# Patient Record
Sex: MALE | Race: WHITE | NOT HISPANIC OR LATINO | Employment: OTHER | URBAN - NONMETROPOLITAN AREA
[De-identification: names, ages, dates, MRNs, and addresses within clinical notes are randomized per-mention and may not be internally consistent; named-entity substitution may affect disease eponyms.]

---

## 2017-10-23 ENCOUNTER — OFFICE VISIT (OUTPATIENT)
Dept: RETAIL CLINIC | Facility: CLINIC | Age: 42
End: 2017-10-23

## 2017-10-23 VITALS
OXYGEN SATURATION: 98 % | TEMPERATURE: 97.6 F | HEART RATE: 87 BPM | DIASTOLIC BLOOD PRESSURE: 87 MMHG | SYSTOLIC BLOOD PRESSURE: 131 MMHG

## 2017-10-23 DIAGNOSIS — J20.9 ACUTE BRONCHITIS, UNSPECIFIED ORGANISM: Primary | ICD-10-CM

## 2017-10-23 PROCEDURE — 99202 OFFICE O/P NEW SF 15 MIN: CPT | Performed by: NURSE PRACTITIONER

## 2017-10-23 RX ORDER — METHYLPREDNISOLONE 4 MG/1
TABLET ORAL
Qty: 1 EACH | Refills: 0 | Status: SHIPPED | OUTPATIENT
Start: 2017-10-23 | End: 2018-03-07

## 2017-10-23 RX ORDER — AZITHROMYCIN 250 MG/1
TABLET, FILM COATED ORAL
Qty: 6 TABLET | Refills: 0 | Status: SHIPPED | OUTPATIENT
Start: 2017-10-23 | End: 2018-03-07

## 2017-10-23 NOTE — PATIENT INSTRUCTIONS
Increase fluid intake  Warm salt water gargles as needed for sore throat  Do not over suppress cough  Follow up with Dr. Malave regarding lump to neck  If no improvement over next 2-3 days or symptoms worsen, follow up with PCP      Acute Bronchitis  Bronchitis is inflammation of the airways that extend from the windpipe into the lungs (bronchi). The inflammation often causes mucus to develop. This leads to a cough, which is the most common symptom of bronchitis.   In acute bronchitis, the condition usually develops suddenly and goes away over time, usually in a couple weeks. Smoking, allergies, and asthma can make bronchitis worse. Repeated episodes of bronchitis may cause further lung problems.   CAUSES  Acute bronchitis is most often caused by the same virus that causes a cold. The virus can spread from person to person (contagious) through coughing, sneezing, and touching contaminated objects.  SIGNS AND SYMPTOMS   · Cough.    · Fever.    · Coughing up mucus.    · Body aches.    · Chest congestion.    · Chills.    · Shortness of breath.    · Sore throat.    DIAGNOSIS   Acute bronchitis is usually diagnosed through a physical exam. Your health care provider will also ask you questions about your medical history. Tests, such as chest X-rays, are sometimes done to rule out other conditions.   TREATMENT   Acute bronchitis usually goes away in a couple weeks. Oftentimes, no medical treatment is necessary. Medicines are sometimes given for relief of fever or cough. Antibiotic medicines are usually not needed but may be prescribed in certain situations. In some cases, an inhaler may be recommended to help reduce shortness of breath and control the cough. A cool mist vaporizer may also be used to help thin bronchial secretions and make it easier to clear the chest.   HOME CARE INSTRUCTIONS  · Get plenty of rest.    · Drink enough fluids to keep your urine clear or pale yellow (unless you have a medical condition that  requires fluid restriction). Increasing fluids may help thin your respiratory secretions (sputum) and reduce chest congestion, and it will prevent dehydration.    · Take medicines only as directed by your health care provider.  · If you were prescribed an antibiotic medicine, finish it all even if you start to feel better.  · Avoid smoking and secondhand smoke. Exposure to cigarette smoke or irritating chemicals will make bronchitis worse. If you are a smoker, consider using nicotine gum or skin patches to help control withdrawal symptoms. Quitting smoking will help your lungs heal faster.    · Reduce the chances of another bout of acute bronchitis by washing your hands frequently, avoiding people with cold symptoms, and trying not to touch your hands to your mouth, nose, or eyes.    · Keep all follow-up visits as directed by your health care provider.    SEEK MEDICAL CARE IF:  Your symptoms do not improve after 1 week of treatment.   SEEK IMMEDIATE MEDICAL CARE IF:  · You develop an increased fever or chills.    · You have chest pain.    · You have severe shortness of breath.  · You have bloody sputum.    · You develop dehydration.  · You faint or repeatedly feel like you are going to pass out.  · You develop repeated vomiting.  · You develop a severe headache.  MAKE SURE YOU:   · Understand these instructions.  · Will watch your condition.  · Will get help right away if you are not doing well or get worse.     This information is not intended to replace advice given to you by your health care provider. Make sure you discuss any questions you have with your health care provider.     Document Released: 01/25/2006 Document Revised: 01/08/2016 Document Reviewed: 06/10/2014  [employer] Interactive Patient Education ©2017 [employer]

## 2017-10-23 NOTE — PROGRESS NOTES
"Subjective   Madi Vazquez is a 42 y.o. male.     Cough   This is a new problem. The current episode started 1 to 4 weeks ago (2 weeks). The problem has been waxing and waning. Cough characteristics: LIttle bit of yellow/clear. Associated symptoms include a fever (When it first started; has been over 1 week since last fever) and wheezing. Pertinent negatives include no ear pain (Was positive for left ear pain; has resolved) or shortness of breath. Associated symptoms comments: Initial symptoms start has head congestion; this has resolved but now congestion remains in chest.  Patient states today he thinks it is \"going away\" but wife wanted him \"checked out\". Treatments tried: Tussin; Oscillococcinum. The treatment provided mild relief. There is no history of asthma, bronchitis, COPD or pneumonia. Granuloma on lung    Patient reports that he walks 2 miles a day and still able to perform this activity without difficulty.     The following portions of the patient's history were reviewed and updated as appropriate: allergies, current medications, past family history, past medical history, past social history, past surgical history and problem list.    Review of Systems   Constitutional: Positive for fever (When it first started; has been over 1 week since last fever).   HENT: Positive for congestion. Negative for ear pain (Was positive for left ear pain; has resolved) and sinus pressure. Sinus pain: Has resolved.    Eyes: Positive for visual disturbance (Little blurriness just while sitting in waiting area. ).   Respiratory: Positive for cough, chest tightness and wheezing. Negative for shortness of breath.    Gastrointestinal: Positive for vomiting. Negative for diarrhea and nausea. Rectal pain: States he vomits when \"sinuses start draining\"   Musculoskeletal: Positive for back pain (Lower back).       Objective   Physical Exam   Constitutional: He appears well-developed and well-nourished. No distress.   HENT:   Right " Ear: External ear normal. Tympanic membrane is not erythematous (Pink) and not bulging.   Left Ear: External ear normal. Tympanic membrane is not erythematous (Pink) and not bulging.   Nose: Right sinus exhibits no maxillary sinus tenderness and no frontal sinus tenderness. Left sinus exhibits no maxillary sinus tenderness and no frontal sinus tenderness.   Mouth/Throat: Posterior oropharyngeal erythema present. No oropharyngeal exudate.   Neck: Neck supple.   Patient had full, long beard covering lower face and neck.  On palpation noted slightly enlarged tonsillar nodes and 1 harder, nontender, mobile lump to right side near tonsillar node.  Patient believes this has been there for awhile.    Cardiovascular: Normal rate, regular rhythm and normal heart sounds.  Exam reveals no gallop and no friction rub.    No murmur heard.  Pulmonary/Chest: Effort normal and breath sounds normal. No respiratory distress. He has no decreased breath sounds. He has no wheezes. He has no rhonchi. He has no rales. He exhibits no tenderness.   Coarse breath sounds throughout.    Lymphadenopathy:     He has no cervical adenopathy.   Neurological: He is alert.   Skin: Skin is warm and dry. He is not diaphoretic.   Psychiatric: He has a normal mood and affect. His behavior is normal.       Assessment/Plan   Madi was seen today for cough.    Diagnoses and all orders for this visit:    Acute bronchitis, unspecified organism  -     azithromycin (ZITHROMAX Z-NELLA) 250 MG tablet; Take 2 tablets the first day, then 1 tablet daily for 4 days.  -     MethylPREDNISolone (MEDROL, NELLA,) 4 MG tablet; Take as directed on package instructions.      Discussed with patient to monitor symptoms closely and follow up with PCP for possible chest x-ray if needed.  Informed that cough can linger at times with Bronchitis.  Discussed concerns with lump noted to right side of neck.  Patient believes this has been there for some time now but also states he has a  scar located in that area.  Encouraged follow up with PCP for re-evaluation.  Discussed concerns and need for further evaluation.  Verbalized understanding.     Increase fluid intake  Warm salt water gargles as needed for sore throat  Do not over suppress cough  Follow up with Dr. Malave regarding lump to neck  If no improvement over next 2-3 days or symptoms worsen, follow up with PCP

## 2018-03-07 ENCOUNTER — OFFICE VISIT (OUTPATIENT)
Dept: RETAIL CLINIC | Facility: CLINIC | Age: 43
End: 2018-03-07

## 2018-03-07 VITALS
TEMPERATURE: 99.7 F | DIASTOLIC BLOOD PRESSURE: 80 MMHG | HEART RATE: 90 BPM | OXYGEN SATURATION: 98 % | SYSTOLIC BLOOD PRESSURE: 115 MMHG

## 2018-03-07 DIAGNOSIS — R68.89 FLU-LIKE SYMPTOMS: ICD-10-CM

## 2018-03-07 DIAGNOSIS — J20.9 ACUTE BRONCHITIS, UNSPECIFIED ORGANISM: Primary | ICD-10-CM

## 2018-03-07 PROCEDURE — 99213 OFFICE O/P EST LOW 20 MIN: CPT | Performed by: NURSE PRACTITIONER

## 2018-03-07 RX ORDER — BENZONATATE 200 MG/1
200 CAPSULE ORAL 3 TIMES DAILY PRN
Qty: 15 CAPSULE | Refills: 0 | Status: SHIPPED | OUTPATIENT
Start: 2018-03-07 | End: 2019-10-31

## 2018-03-07 RX ORDER — BENZONATATE 200 MG/1
200 CAPSULE ORAL 3 TIMES DAILY PRN
Qty: 15 CAPSULE | Refills: 0 | Status: SHIPPED | OUTPATIENT
Start: 2018-03-07 | End: 2018-03-07 | Stop reason: SDUPTHER

## 2018-03-07 RX ORDER — AZITHROMYCIN 250 MG/1
TABLET, FILM COATED ORAL
Qty: 6 TABLET | Refills: 0 | Status: SHIPPED | OUTPATIENT
Start: 2018-03-07 | End: 2019-10-31

## 2018-03-07 RX ORDER — AZITHROMYCIN 250 MG/1
TABLET, FILM COATED ORAL
Qty: 6 TABLET | Refills: 0 | Status: SHIPPED | OUTPATIENT
Start: 2018-03-07 | End: 2018-03-07 | Stop reason: SDUPTHER

## 2018-03-07 RX ORDER — ONDANSETRON 4 MG/1
4 TABLET, ORALLY DISINTEGRATING ORAL EVERY 8 HOURS PRN
Qty: 10 TABLET | Refills: 0 | Status: SHIPPED | OUTPATIENT
Start: 2018-03-07

## 2018-03-07 NOTE — PATIENT INSTRUCTIONS
Monitor symptoms closely.    It is possible you have Flu/ or other upper resp virus.  You need to be careful not to over suppress your cough.  Make sure you take good deep breaths and cough to keep lungs clear.   If you develop high temp, you are not seeing any improvement over the next 24-48 hours, or symptom start to worsen, follow up with primary doctor or urgent care clinic for further evaluation.    Increase your fluids.  Rest.       Influenza, Adult  Influenza, more commonly known as “the flu,” is a viral infection that primarily affects the respiratory tract. The respiratory tract includes organs that help you breathe, such as the lungs, nose, and throat. The flu causes many common cold symptoms, as well as a high fever and body aches.  The flu spreads easily from person to person (is contagious). Getting a flu shot (influenza vaccination) every year is the best way to prevent influenza.  What are the causes?  Influenza is caused by a virus. You can catch the virus by:  · Breathing in droplets from an infected person's cough or sneeze.  · Touching something that was recently contaminated with the virus and then touching your mouth, nose, or eyes.  What increases the risk?  The following factors may make you more likely to get the flu:  · Not cleaning your hands frequently with soap and water or alcohol-based hand .  · Having close contact with many people during cold and flu season.  · Touching your mouth, eyes, or nose without washing or sanitizing your hands first.  · Not drinking enough fluids or not eating a healthy diet.  · Not getting enough sleep or exercise.  · Being under a high amount of stress.  · Not getting a yearly (annual) flu shot.  You may be at a higher risk of complications from the flu, such as a severe lung infection (pneumonia), if you:  · Are over the age of 65.  · Are pregnant.  · Have a weakened disease-fighting system (immune system). You may have a weakened immune system if  you:  ¨ Have HIV or AIDS.  ¨ Are undergoing chemotherapy.  ¨ Are taking medicines that reduce the activity of (suppress) the immune system.  · Have a long-term (chronic) illness, such as heart disease, kidney disease, diabetes, or lung disease.  · Have a liver disorder.  · Are obese.  · Have anemia.  What are the signs or symptoms?  Symptoms of this condition typically last 4-10 days and may include:  · Fever.  · Chills.  · Headache, body aches, or muscle aches.  · Sore throat.  · Cough.  · Runny or congested nose.  · Chest discomfort and cough.  · Poor appetite.  · Weakness or tiredness (fatigue).  · Dizziness.  · Nausea or vomiting.  How is this diagnosed?  This condition may be diagnosed based on your medical history and a physical exam. Your health care provider may do a nose or throat swab test to confirm the diagnosis.  How is this treated?  If influenza is detected early, you can be treated with antiviral medicine that can reduce the length of your illness and the severity of your symptoms. This medicine may be given by mouth (orally) or through an IV tube that is inserted in one of your veins.  The goal of treatment is to relieve symptoms by taking care of yourself at home. This may include taking over-the-counter medicines, drinking plenty of fluids, and adding humidity to the air in your home.  In some cases, influenza goes away on its own. Severe influenza or complications from influenza may be treated in a hospital.  Follow these instructions at home:  · Take over-the-counter and prescription medicines only as told by your health care provider.  · Use a cool mist humidifier to add humidity to the air in your home. This can make breathing easier.  · Rest as needed.  · Drink enough fluid to keep your urine clear or pale yellow.  · Cover your mouth and nose when you cough or sneeze.  · Wash your hands with soap and water often, especially after you cough or sneeze. If soap and water are not available, use  hand .  · Stay home from work or school as told by your health care provider. Unless you are visiting your health care provider, try to avoid leaving home until your fever has been gone for 24 hours without the use of medicine.  · Keep all follow-up visits as told by your health care provider. This is important.  How is this prevented?  · Getting an annual flu shot is the best way to avoid getting the flu. You may get the flu shot in late summer, fall, or winter. Ask your health care provider when you should get your flu shot.  · Wash your hands often or use hand  often.  · Avoid contact with people who are sick during cold and flu season.  · Eat a healthy diet, drink plenty of fluids, get enough sleep, and exercise regularly.  Contact a health care provider if:  · You develop new symptoms.  · You have:  ¨ Chest pain.  ¨ Diarrhea.  ¨ A fever.  · Your cough gets worse.  · You produce more mucus.  · You feel nauseous or you vomit.  Get help right away if:  · You develop shortness of breath or difficulty breathing.  · Your skin or nails turn a bluish color.  · You have severe pain or stiffness in your neck.  · You develop a sudden headache or sudden pain in your face or ear.  · You cannot stop vomiting.  This information is not intended to replace advice given to you by your health care provider. Make sure you discuss any questions you have with your health care provider.  Document Released: 12/15/2001 Document Revised: 05/25/2017 Document Reviewed: 10/11/2016  [employer] Interactive Patient Education © 2017 [employer]    Acute Bronchitis, Adult  Acute bronchitis is sudden (acute) swelling of the air tubes (bronchi) in the lungs. Acute bronchitis causes these tubes to fill with mucus, which can make it hard to breathe. It can also cause coughing or wheezing.  In adults, acute bronchitis usually goes away within 2 weeks. A cough caused by bronchitis may last up to 3 weeks. Smoking, allergies, and  asthma can make the condition worse. Repeated episodes of bronchitis may cause further lung problems, such as chronic obstructive pulmonary disease (COPD).  What are the causes?  This condition can be caused by germs and by substances that irritate the lungs, including:  · Cold and flu viruses. This condition is most often caused by the same virus that causes a cold.  · Bacteria.  · Exposure to tobacco smoke, dust, fumes, and air pollution.  What increases the risk?  This condition is more likely to develop in people who:  · Have close contact with someone with acute bronchitis.  · Are exposed to lung irritants, such as tobacco smoke, dust, fumes, and vapors.  · Have a weak immune system.  · Have a respiratory condition such as asthma.  What are the signs or symptoms?  Symptoms of this condition include:  · A cough.  · Coughing up clear, yellow, or green mucus.  · Wheezing.  · Chest congestion.  · Shortness of breath.  · A fever.  · Body aches.  · Chills.  · A sore throat.  How is this diagnosed?  This condition is usually diagnosed with a physical exam. During the exam, your health care provider may order tests, such as chest X-rays, to rule out other conditions. He or she may also:  · Test a sample of your mucus for bacterial infection.  · Check the level of oxygen in your blood. This is done to check for pneumonia.  · Do a chest X-ray or lung function testing to rule out pneumonia and other conditions.  · Perform blood tests.  Your health care provider will also ask about your symptoms and medical history.  How is this treated?  Most cases of acute bronchitis clear up over time without treatment. Your health care provider may recommend:  · Drinking more fluids. Drinking more makes your mucus thinner, which may make it easier to breathe.  · Taking a medicine for a fever or cough.  · Taking an antibiotic medicine.  · Using an inhaler to help improve shortness of breath and to control a cough.  · Using a cool mist  vaporizer or humidifier to make it easier to breathe.  Follow these instructions at home:  Medicines   · Take over-the-counter and prescription medicines only as told by your health care provider.  · If you were prescribed an antibiotic, take it as told by your health care provider. Do not stop taking the antibiotic even if you start to feel better.  General instructions   · Get plenty of rest.  · Drink enough fluids to keep your urine clear or pale yellow.  · Avoid smoking and secondhand smoke. Exposure to cigarette smoke or irritating chemicals will make bronchitis worse. If you smoke and you need help quitting, ask your health care provider. Quitting smoking will help your lungs heal faster.  · Use an inhaler, cool mist vaporizer, or humidifier as told by your health care provider.  · Keep all follow-up visits as told by your health care provider. This is important.  How is this prevented?  To lower your risk of getting this condition again:  · Wash your hands often with soap and water. If soap and water are not available, use hand .  · Avoid contact with people who have cold symptoms.  · Try not to touch your hands to your mouth, nose, or eyes.  · Make sure to get the flu shot every year.  Contact a health care provider if:  · Your symptoms do not improve in 2 weeks of treatment.  Get help right away if:  · You cough up blood.  · You have chest pain.  · You have severe shortness of breath.  · You become dehydrated.  · You faint or keep feeling like you are going to faint.  · You keep vomiting.  · You have a severe headache.  · Your fever or chills gets worse.  This information is not intended to replace advice given to you by your health care provider. Make sure you discuss any questions you have with your health care provider.  Document Released: 01/25/2006 Document Revised: 07/12/2017 Document Reviewed: 06/07/2017  [employer] Interactive Patient Education © 2017 [employer]

## 2018-03-07 NOTE — PROGRESS NOTES
Subjective   Madi Vazquez is a 43 y.o. male.     Cough   This is a new problem. The current episode started in the past 7 days (2 nights). The problem has been gradually worsening. Cough characteristics: Clear. Associated symptoms include myalgias (Back pain, leg pain (thinks it is related to vomiting)), rhinorrhea, a sore throat and sweats (Last night). Chest pain: Burning. Fever: Has not checked. Associated symptoms comments: Vomiting from coughing. The symptoms are aggravated by lying down (Son has had cough and fever but these symptoms have resolved for him). Risk factors for lung disease include smoking/tobacco exposure. Treatments tried: Sudafed. The treatment provided no relief. His past medical history is significant for bronchitis. There is no history of asthma, COPD or pneumonia.        The following portions of the patient's history were reviewed and updated as appropriate: allergies, current medications, past family history, past medical history, past social history, past surgical history and problem list.    Review of Systems   Constitutional: Positive for appetite change (Doesn't feel like eating). Fever: Has not checked.   HENT: Positive for congestion, rhinorrhea and sore throat.    Eyes: Negative.    Respiratory: Positive for cough.    Cardiovascular: Chest pain: Burning.   Gastrointestinal: Positive for nausea and vomiting. Negative for abdominal pain and diarrhea.   Genitourinary: Negative for dysuria, frequency and urgency.   Musculoskeletal: Positive for myalgias (Back pain, leg pain (thinks it is related to vomiting)).       Objective   Physical Exam   Constitutional: He appears well-developed and well-nourished. He appears ill (Mild; looks like he doesn't feel well). No distress.   HENT:   Right Ear: External ear normal. Tympanic membrane is not erythematous (Pink) and not bulging.   Left Ear: External ear normal. Tympanic membrane is not erythematous (Pink) and not bulging.   Nose: Right  sinus exhibits frontal sinus tenderness. Right sinus exhibits no maxillary sinus tenderness. Left sinus exhibits frontal sinus tenderness. Left sinus exhibits no maxillary sinus tenderness.   Mouth/Throat: Posterior oropharyngeal erythema (PND noted) present. No oropharyngeal exudate. Tonsils are 0 on the right. Tonsils are 0 on the left.   Neck: Neck supple.   Cardiovascular: Normal rate, regular rhythm and normal heart sounds.  Exam reveals no gallop and no friction rub.    No murmur heard.  Pulmonary/Chest: Effort normal and breath sounds normal. No respiratory distress. He has no decreased breath sounds. He has no wheezes. He has no rhonchi. He has no rales. He exhibits tenderness.   Coarse breath sounds throughout; more pronounced to SKYLER; Frequent dry cough during exam   Abdominal: Bowel sounds are normal.   Lymphadenopathy:     He has no cervical adenopathy.   Neurological: He is alert.   Skin: Skin is warm and dry. He is not diaphoretic.   Psychiatric: He has a normal mood and affect. His behavior is normal.       Assessment/Plan   Madi was seen today for cough.    Diagnoses and all orders for this visit:    Acute bronchitis, unspecified organism  -     azithromycin (ZITHROMAX Z-NELLA) 250 MG tablet; Take 2 tablets the first day, then 1 tablet daily for 4 days.  -     benzonatate (TESSALON) 200 MG capsule; Take 1 capsule by mouth 3 (Three) Times a Day As Needed for Cough.    Flu-like symptoms    Offered to send for Flu test today in other outpatient settling.  Not available in this clinic at this time.  Patient declined at this time.  Advised to contact clinic if he decided he wanted to get one.   Patient declined any urinary issues today.  States he thinks he passes a kidney stone yesterday.  As long history of this and is familiar with what stones feel like.  Denies that yesterday was any different than usual episodes.  Instructed to seek additional evaluation at higher level of care if any urinary  symptoms returned.     Monitor symptoms closely.    It is possible you have Flu/ or other upper resp virus.  You need to be careful not to over suppress your cough.  Make sure you take good deep breaths and cough to keep lungs clear.   If you develop high temp, you are not seeing any improvement over the next 24-48 hours, or symptom start to worsen, follow up with primary doctor or urgent care clinic for further evaluation.    Increase your fluids.  Rest.

## 2018-07-04 ENCOUNTER — APPOINTMENT (OUTPATIENT)
Dept: CT IMAGING | Age: 43
End: 2018-07-04
Payer: MEDICARE

## 2018-07-04 ENCOUNTER — APPOINTMENT (OUTPATIENT)
Dept: GENERAL RADIOLOGY | Age: 43
End: 2018-07-04
Payer: MEDICARE

## 2018-07-04 ENCOUNTER — HOSPITAL ENCOUNTER (EMERGENCY)
Age: 43
Discharge: HOME | End: 2018-07-04
Attending: EMERGENCY MEDICINE
Payer: MEDICARE

## 2018-07-04 VITALS
BODY MASS INDEX: 25.2 KG/M2 | HEIGHT: 71 IN | DIASTOLIC BLOOD PRESSURE: 74 MMHG | WEIGHT: 180 LBS | OXYGEN SATURATION: 95 % | RESPIRATION RATE: 18 BRPM | HEART RATE: 78 BPM | TEMPERATURE: 99.1 F | SYSTOLIC BLOOD PRESSURE: 122 MMHG

## 2018-07-04 DIAGNOSIS — S30.1XXA CONTUSION OF ABDOMINAL WALL, INITIAL ENCOUNTER: ICD-10-CM
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
## 2018-07-04 DIAGNOSIS — S09.90XA INJURY OF HEAD, INITIAL ENCOUNTER: ICD-10-CM

## 2018-07-04 DIAGNOSIS — S02.2XXA CLOSED FRACTURE OF NASAL BONE, INITIAL ENCOUNTER: ICD-10-CM

## 2018-07-04 DIAGNOSIS — R55 SYNCOPE AND COLLAPSE: Primary | ICD-10-CM

## 2018-07-04 LAB
ALBUMIN SERPL-MCNC: 4.8 G/DL (ref 3.5–5.2)
ALP BLD-CCNC: 79 U/L (ref 40–130)
ALT SERPL-CCNC: 11 U/L (ref 5–41)
ANION GAP SERPL CALCULATED.3IONS-SCNC: 16 MMOL/L (ref 7–19)
AST SERPL-CCNC: 18 U/L (ref 5–40)
BASOPHILS ABSOLUTE: 0.1 K/UL (ref 0–0.2)
BASOPHILS RELATIVE PERCENT: 0.4 % (ref 0–1)
BILIRUB SERPL-MCNC: 0.4 MG/DL (ref 0.2–1.2)
BUN BLDV-MCNC: 11 MG/DL (ref 6–20)
CALCIUM SERPL-MCNC: 9.5 MG/DL (ref 8.6–10)
CHLORIDE BLD-SCNC: 98 MMOL/L (ref 98–111)
CO2: 23 MMOL/L (ref 22–29)
CREAT SERPL-MCNC: 0.8 MG/DL (ref 0.5–1.2)
EOSINOPHILS ABSOLUTE: 0 K/UL (ref 0–0.6)
EOSINOPHILS RELATIVE PERCENT: 0.2 % (ref 0–5)
GFR NON-AFRICAN AMERICAN: >60
GLUCOSE BLD-MCNC: 104 MG/DL (ref 70–99)
GLUCOSE BLD-MCNC: 111 MG/DL (ref 74–109)
HCT VFR BLD CALC: 46.6 % (ref 42–52)
HEMOGLOBIN: 15.7 G/DL (ref 14–18)
LYMPHOCYTES ABSOLUTE: 1.6 K/UL (ref 1.1–4.5)
LYMPHOCYTES RELATIVE PERCENT: 13.6 % (ref 20–40)
MCH RBC QN AUTO: 30.2 PG (ref 27–31)
MCHC RBC AUTO-ENTMCNC: 33.7 G/DL (ref 33–37)
MCV RBC AUTO: 89.6 FL (ref 80–94)
MONOCYTES ABSOLUTE: 0.8 K/UL (ref 0–0.9)
MONOCYTES RELATIVE PERCENT: 6.3 % (ref 0–10)
NEUTROPHILS ABSOLUTE: 9.5 K/UL (ref 1.5–7.5)
NEUTROPHILS RELATIVE PERCENT: 79 % (ref 50–65)
PDW BLD-RTO: 13.2 % (ref 11.5–14.5)
PERFORMED ON: ABNORMAL
PLATELET # BLD: 334 K/UL (ref 130–400)
PMV BLD AUTO: 8.7 FL (ref 9.4–12.4)
POTASSIUM SERPL-SCNC: 4.1 MMOL/L (ref 3.5–5)
RBC # BLD: 5.2 M/UL (ref 4.7–6.1)
SODIUM BLD-SCNC: 137 MMOL/L (ref 136–145)
TOTAL PROTEIN: 7.3 G/DL (ref 6.6–8.7)
WBC # BLD: 12 K/UL (ref 4.8–10.8)

## 2018-07-04 PROCEDURE — 99284 EMERGENCY DEPT VISIT MOD MDM: CPT

## 2018-07-04 PROCEDURE — 80053 COMPREHEN METABOLIC PANEL: CPT

## 2018-07-04 PROCEDURE — 85025 COMPLETE CBC W/AUTO DIFF WBC: CPT

## 2018-07-04 PROCEDURE — 2580000003 HC RX 258: Performed by: EMERGENCY MEDICINE

## 2018-07-04 PROCEDURE — 36415 COLL VENOUS BLD VENIPUNCTURE: CPT

## 2018-07-04 PROCEDURE — 99284 EMERGENCY DEPT VISIT MOD MDM: CPT | Performed by: EMERGENCY MEDICINE

## 2018-07-04 PROCEDURE — 82948 REAGENT STRIP/BLOOD GLUCOSE: CPT

## 2018-07-04 PROCEDURE — 70450 CT HEAD/BRAIN W/O DYE: CPT

## 2018-07-04 PROCEDURE — 21310 PR CLOSED RX NOSE FRACTURE: CPT | Performed by: EMERGENCY MEDICINE

## 2018-07-04 PROCEDURE — 72125 CT NECK SPINE W/O DYE: CPT

## 2018-07-04 PROCEDURE — 72100 X-RAY EXAM L-S SPINE 2/3 VWS: CPT

## 2018-07-04 PROCEDURE — 70486 CT MAXILLOFACIAL W/O DYE: CPT

## 2018-07-04 RX ORDER — 0.9 % SODIUM CHLORIDE 0.9 %
1000 INTRAVENOUS SOLUTION INTRAVENOUS ONCE
Status: COMPLETED | OUTPATIENT
Start: 2018-07-04 | End: 2018-07-04

## 2018-07-04 RX ORDER — CYCLOBENZAPRINE HCL 10 MG
10 TABLET ORAL 3 TIMES DAILY PRN
Qty: 20 TABLET | Refills: 0 | Status: SHIPPED | OUTPATIENT
Start: 2018-07-04 | End: 2018-07-14

## 2018-07-04 RX ORDER — HYDROCODONE BITARTRATE AND ACETAMINOPHEN 7.5; 325 MG/1; MG/1
1 TABLET ORAL EVERY 6 HOURS PRN
Qty: 15 TABLET | Refills: 0 | Status: SHIPPED | OUTPATIENT
Start: 2018-07-04 | End: 2018-07-07

## 2018-07-04 RX ADMIN — SODIUM CHLORIDE 1000 ML: 9 INJECTION, SOLUTION INTRAVENOUS at 06:13

## 2018-07-04 ASSESSMENT — ENCOUNTER SYMPTOMS
CONSTIPATION: 0
SHORTNESS OF BREATH: 0
SORE THROAT: 0
SINUS PRESSURE: 0
BLOOD IN STOOL: 0
VOICE CHANGE: 0
EYE DISCHARGE: 0
APNEA: 0
NAUSEA: 0
CHOKING: 0
DIARRHEA: 0
FACIAL SWELLING: 0
ABDOMINAL PAIN: 0

## 2018-07-04 ASSESSMENT — PAIN SCALES - GENERAL: PAINLEVEL_OUTOF10: 7

## 2018-07-04 NOTE — ED PROVIDER NOTES
ULTRASOUND:   Performed by ED Physician - none    LABS:  Labs Reviewed   CBC WITH AUTO DIFFERENTIAL - Abnormal; Notable for the following:        Result Value    WBC 12.0 (*)     MPV 8.7 (*)     Neutrophils % 79.0 (*)     Lymphocytes % 13.6 (*)     Neutrophils # 9.5 (*)     All other components within normal limits   COMPREHENSIVE METABOLIC PANEL - Abnormal; Notable for the following:     Glucose 111 (*)     All other components within normal limits   POCT GLUCOSE - Abnormal; Notable for the following:     POC Glucose 104 (*)     All other components within normal limits       All other labs were within normal range or not returned as of this dictation. EMERGENCY DEPARTMENT COURSE and DIFFERENTIAL DIAGNOSIS/MDM:   Vitals:    Vitals:    07/04/18 0400 07/04/18 0530 07/04/18 0630 07/04/18 0700   BP: 133/85 123/81 124/79 122/74   Pulse: 96 83 80 78   Resp: 18 17 16 18   Temp: 99.1 °F (37.3 °C)      SpO2: 97% 98% 92% 95%   Weight: 180 lb (81.6 kg)      Height: 5' 11\" (1.803 m)          MDM  Number of Diagnoses or Management Options  Diagnosis management comments: Patient remained stable. No neurologic deficits. We discussed and reviewed pitchers of his x-rays. CONSULTS:  None    PROCEDURES:  Unless otherwise noted below, none     Procedures    FINAL IMPRESSION      1. Syncope and collapse    2. Fall, initial encounter    3. Injury of head, initial encounter    4. Closed fracture of nasal bone, initial encounter    5.  Contusion of abdominal wall, initial encounter          DISPOSITION/PLAN   DISPOSITION Decision To Discharge 07/04/2018 07:40:18 AM      PATIENT REFERRED TO:  Luci Gray MD  [phone] [id number]  [phone]            DISCHARGE MEDICATIONS:  New Prescriptions    CYCLOBENZAPRINE (FLEXERIL) 10 MG TABLET    Take 1 tablet by mouth 3 times daily as needed for Muscle spasms    HYDROCODONE-ACETAMINOPHEN (NORCO) 7.5-325 MG PER TABLET    Take 1 tablet by mouth every 6 hours as needed

## 2019-10-31 ENCOUNTER — OFFICE VISIT (OUTPATIENT)
Dept: RETAIL CLINIC | Facility: CLINIC | Age: 44
End: 2019-10-31

## 2019-10-31 VITALS
DIASTOLIC BLOOD PRESSURE: 78 MMHG | TEMPERATURE: 99.1 F | HEART RATE: 88 BPM | OXYGEN SATURATION: 98 % | RESPIRATION RATE: 18 BRPM | SYSTOLIC BLOOD PRESSURE: 132 MMHG

## 2019-10-31 DIAGNOSIS — J20.9 ACUTE BRONCHITIS, UNSPECIFIED ORGANISM: Primary | ICD-10-CM

## 2019-10-31 PROCEDURE — 99213 OFFICE O/P EST LOW 20 MIN: CPT | Performed by: NURSE PRACTITIONER

## 2019-10-31 RX ORDER — METHYLPREDNISOLONE 4 MG/1
TABLET ORAL
Qty: 21 TABLET | Refills: 0 | Status: SHIPPED | OUTPATIENT
Start: 2019-10-31

## 2019-10-31 RX ORDER — AZITHROMYCIN 250 MG/1
TABLET, FILM COATED ORAL
Qty: 6 TABLET | Refills: 0 | Status: SHIPPED | OUTPATIENT
Start: 2019-10-31

## 2019-10-31 RX ORDER — BENZONATATE 200 MG/1
200 CAPSULE ORAL 2 TIMES DAILY PRN
Qty: 20 CAPSULE | Refills: 0 | Status: SHIPPED | OUTPATIENT
Start: 2019-10-31

## 2019-10-31 NOTE — PATIENT INSTRUCTIONS
Acute Bronchitis, Adult  Acute bronchitis is when air tubes (bronchi) in the lungs suddenly get swollen. The condition can make it hard to breathe. It can also cause these symptoms:  · A cough.  · Coughing up clear, yellow, or green mucus.  · Wheezing.  · Chest congestion.  · Shortness of breath.  · A fever.  · Body aches.  · Chills.  · A sore throat.  Follow these instructions at home:    Medicines  · Take over-the-counter and prescription medicines only as told by your doctor.  · If you were prescribed an antibiotic medicine, take it as told by your doctor. Do not stop taking the antibiotic even if you start to feel better.  General instructions  · Rest.  · Drink enough fluids to keep your pee (urine) pale yellow.  · Avoid smoking and secondhand smoke. If you smoke and you need help quitting, ask your doctor. Quitting will help your lungs heal faster.  · Use an inhaler, cool mist vaporizer, or humidifier as told by your doctor.  · Keep all follow-up visits as told by your doctor. This is important.  How is this prevented?  To lower your risk of getting this condition again:  · Wash your hands often with soap and water. If you cannot use soap and water, use hand .  · Avoid contact with people who have cold symptoms.  · Try not to touch your hands to your mouth, nose, or eyes.  · Make sure to get the flu shot every year.  Contact a doctor if:  · Your symptoms do not get better in 2 weeks.  Get help right away if:  · You cough up blood.  · You have chest pain.  · You have very bad shortness of breath.  · You become dehydrated.  · You faint (pass out) or keep feeling like you are going to pass out.  · You keep throwing up (vomiting).  · You have a very bad headache.  · Your fever or chills gets worse.  This information is not intended to replace advice given to you by your health care provider. Make sure you discuss any questions you have with your health care provider.  Document Released: 06/05/2009 Document  Revised: 08/01/2018 Document Reviewed: 06/07/2017  [employer] Patient Education © 2019 [employer]

## 2019-10-31 NOTE — PROGRESS NOTES
Chief Complaint   Patient presents with   • Cough     Subjective   Madi Vazquez is a 44 y.o. male who presents to the clinic today with complaints of: cough and congestion for about a week. He feels the cough is worse and burns his chest. He gets this every year and the treatments in the past uauslly work well for him.   Cough   This is a new problem. The current episode started 1 to 4 weeks ago. The problem has been gradually worsening. The problem occurs every few minutes. The cough is productive of purulent sputum. Associated symptoms include chest pain (burning with cough ), headaches, nasal congestion, postnasal drip, a sore throat and wheezing. Pertinent negatives include no chills, ear congestion, ear pain, fever, heartburn, hemoptysis, myalgias, rash, rhinorrhea, shortness of breath, sweats or weight loss. The symptoms are aggravated by lying down. Risk factors for lung disease include smoking/tobacco exposure. He has tried nothing for the symptoms. There is no history of asthma, bronchiectasis, bronchitis, COPD, emphysema, environmental allergies or pneumonia.         Current Outpatient Medications:   •  azithromycin (ZITHROMAX Z-NELLA) 250 MG tablet, Take 2 tablets the first day, then 1 tablet daily for 4 days., Disp: 6 tablet, Rfl: 0  •  benzonatate (TESSALON) 200 MG capsule, Take 1 capsule by mouth 2 (Two) Times a Day As Needed for Cough., Disp: 20 capsule, Rfl: 0  •  methylPREDNISolone (MEDROL, NELLA,) 4 MG tablet, Take as directed on package instructions., Disp: 21 tablet, Rfl: 0  •  ondansetron ODT (ZOFRAN ODT) 4 MG disintegrating tablet, Take 1 tablet by mouth Every 8 (Eight) Hours As Needed for Nausea or Vomiting., Disp: 10 tablet, Rfl: 0    Allergies:  Patient has no known allergies.    Past Medical History:   Diagnosis Date   • Chronic pain disorder      Past Surgical History:   Procedure Laterality Date   • BACK SURGERY     • BACK SURGERY      x 3   • SINUS SURGERY       History reviewed. No  pertinent family history.  Social History     Tobacco Use   • Smoking status: Former Smoker   • Smokeless tobacco: Current User   Substance Use Topics   • Alcohol use: No   • Drug use: No       Review of Systems  Review of Systems   Constitutional: Negative for chills, fever and weight loss.   HENT: Positive for postnasal drip and sore throat. Negative for ear pain and rhinorrhea.    Respiratory: Positive for cough and wheezing. Negative for hemoptysis and shortness of breath.    Cardiovascular: Positive for chest pain (burning with cough ).   Gastrointestinal: Negative for heartburn.   Musculoskeletal: Negative for myalgias.   Skin: Negative for rash.   Allergic/Immunologic: Negative for environmental allergies.   Neurological: Positive for headaches.       Objective   /78 (BP Location: Left arm, Patient Position: Sitting, Cuff Size: Adult)   Pulse 88   Temp 99.1 °F (37.3 °C) (Tympanic)   Resp 18   SpO2 98%       Physical Exam   Constitutional: He is oriented to person, place, and time. He appears well-developed and well-nourished.   HENT:   Head: Normocephalic and atraumatic.   Right Ear: Hearing, tympanic membrane, external ear and ear canal normal.   Left Ear: Hearing, tympanic membrane, external ear and ear canal normal.   Nose: Nose normal. Right sinus exhibits no maxillary sinus tenderness and no frontal sinus tenderness. Left sinus exhibits no maxillary sinus tenderness and no frontal sinus tenderness.   Mouth/Throat: Uvula is midline and mucous membranes are normal. Posterior oropharyngeal erythema present. No oropharyngeal exudate, posterior oropharyngeal edema or tonsillar abscesses. Tonsils are 1+ on the right. Tonsils are 1+ on the left. No tonsillar exudate.   Eyes: Pupils are equal, round, and reactive to light.   Neck: Normal range of motion. Neck supple.   Cardiovascular: Normal rate, regular rhythm and normal heart sounds.   Pulmonary/Chest: Effort normal and breath sounds normal. No  stridor. No respiratory distress. He has no wheezes. He has no rales. He exhibits no tenderness.   Lymphadenopathy:        Head (right side): No submental, no submandibular, no tonsillar, no preauricular, no posterior auricular and no occipital adenopathy present.        Head (left side): No submental, no submandibular, no tonsillar, no preauricular, no posterior auricular and no occipital adenopathy present.     He has no cervical adenopathy.   Neurological: He is alert and oriented to person, place, and time.   Skin: Skin is warm and dry.   Psychiatric: He has a normal mood and affect.   Vitals reviewed.      Assessment/Plan     Madi was seen today for cough.    Diagnoses and all orders for this visit:    Acute bronchitis, unspecified organism    Other orders  -     azithromycin (ZITHROMAX Z-NELLA) 250 MG tablet; Take 2 tablets the first day, then 1 tablet daily for 4 days.  -     methylPREDNISolone (MEDROL, NELLA,) 4 MG tablet; Take as directed on package instructions.  -     benzonatate (TESSALON) 200 MG capsule; Take 1 capsule by mouth 2 (Two) Times a Day As Needed for Cough.    Drink plenty of water   Acetaminophen (Tylenol) or ibuprofen for pain or fever > 101  Symptoms should improve over the next 3-4 days and if they do not or worsen follow up.

## 2022-05-24 ENCOUNTER — TRANSCRIBE ORDERS (OUTPATIENT)
Dept: ADMINISTRATIVE | Facility: HOSPITAL | Age: 47
End: 2022-05-24

## 2022-05-24 DIAGNOSIS — R32 URINARY INCONTINENCE, UNSPECIFIED TYPE: Primary | ICD-10-CM

## 2022-05-24 DIAGNOSIS — R55 SYNCOPE AND COLLAPSE: ICD-10-CM

## 2022-05-26 ENCOUNTER — HOSPITAL ENCOUNTER (OUTPATIENT)
Dept: NEUROLOGY | Facility: HOSPITAL | Age: 47
Discharge: HOME | End: 2022-05-26
Admitting: PHYSICIAN ASSISTANT

## 2022-05-26 DIAGNOSIS — R32 URINARY INCONTINENCE, UNSPECIFIED TYPE: ICD-10-CM

## 2022-05-26 DIAGNOSIS — R55 SYNCOPE AND COLLAPSE: ICD-10-CM

## 2022-05-26 PROCEDURE — 95816 EEG AWAKE AND DROWSY: CPT | Performed by: PSYCHIATRY & NEUROLOGY

## 2022-05-26 PROCEDURE — 95816 EEG AWAKE AND DROWSY: CPT

## 2022-06-08 ENCOUNTER — HOSPITAL ENCOUNTER (OUTPATIENT)
Dept: CARDIOLOGY | Facility: HOSPITAL | Age: 47
Discharge: HOME | End: 2022-06-08
Admitting: PHYSICIAN ASSISTANT

## 2022-06-08 DIAGNOSIS — R55 SYNCOPE AND COLLAPSE: ICD-10-CM

## 2022-06-08 DIAGNOSIS — R32 URINARY INCONTINENCE, UNSPECIFIED TYPE: ICD-10-CM

## 2022-06-08 PROCEDURE — 93660 TILT TABLE EVALUATION: CPT

## 2022-06-08 PROCEDURE — 63710000001 NITROGLYCERIN 0.4 MG SUBLINGUAL TABLET 25 EACH BOTTLE: Performed by: PHYSICIAN ASSISTANT

## 2022-06-08 PROCEDURE — 93660 TILT TABLE EVALUATION: CPT | Performed by: INTERNAL MEDICINE

## 2022-06-08 PROCEDURE — A9270 NON-COVERED ITEM OR SERVICE: HCPCS | Performed by: PHYSICIAN ASSISTANT

## 2022-06-08 RX ORDER — NITROGLYCERIN 0.4 MG/1
0.4 TABLET SUBLINGUAL
Status: DISCONTINUED | OUTPATIENT
Start: 2022-06-08 | End: 2022-06-09 | Stop reason: HOSPADM

## 2022-06-08 RX ADMIN — NITROGLYCERIN 0.4 MG: 0.4 TABLET SUBLINGUAL at 14:05

## 2022-06-09 LAB
BH CV TILT AGENT USED: NORMAL
MAXIMAL PREDICTED HEART RATE: 174 BPM
STRESS TARGET HR: 148 BPM

## 2024-03-08 ENCOUNTER — APPOINTMENT (OUTPATIENT)
Dept: GENERAL RADIOLOGY | Age: 49
End: 2024-03-08
Payer: MEDICARE

## 2024-03-08 ENCOUNTER — APPOINTMENT (OUTPATIENT)
Dept: CT IMAGING | Age: 49
End: 2024-03-08
Payer: MEDICARE

## 2024-03-08 ENCOUNTER — HOSPITAL ENCOUNTER (EMERGENCY)
Age: 49
Discharge: HOME | End: 2024-03-08
Attending: EMERGENCY MEDICINE
Payer: MEDICARE

## 2024-03-08 VITALS
HEIGHT: 71 IN | RESPIRATION RATE: 18 BRPM | HEART RATE: 64 BPM | SYSTOLIC BLOOD PRESSURE: 141 MMHG | OXYGEN SATURATION: 96 % | WEIGHT: 200 LBS | TEMPERATURE: 97.8 F | DIASTOLIC BLOOD PRESSURE: 90 MMHG | BODY MASS INDEX: 28 KG/M2

## 2024-03-08 DIAGNOSIS — K52.9 ENTERITIS: ICD-10-CM

## 2024-03-08 DIAGNOSIS — R19.7 NAUSEA VOMITING AND DIARRHEA: ICD-10-CM

## 2024-03-08 DIAGNOSIS — R07.9 CHEST PAIN, UNSPECIFIED TYPE: Primary | ICD-10-CM

## 2024-03-08 DIAGNOSIS — R11.2 NAUSEA VOMITING AND DIARRHEA: ICD-10-CM

## 2024-03-08 LAB
ALBUMIN SERPL-MCNC: 4.9 G/DL (ref 3.5–5.2)
ALP SERPL-CCNC: 78 U/L (ref 40–130)
ALT SERPL-CCNC: 15 U/L (ref 5–41)
ANION GAP SERPL CALCULATED.3IONS-SCNC: 10 MMOL/L (ref 7–19)
AST SERPL-CCNC: 16 U/L (ref 5–40)
BASOPHILS # BLD: 0.1 K/UL (ref 0–0.2)
BASOPHILS NFR BLD: 0.9 % (ref 0–1)
BILIRUB SERPL-MCNC: <0.2 MG/DL (ref 0.2–1.2)
BUN SERPL-MCNC: 15 MG/DL (ref 6–20)
CALCIUM SERPL-MCNC: 9.8 MG/DL (ref 8.6–10)
CHLORIDE SERPL-SCNC: 108 MMOL/L (ref 98–111)
CO2 SERPL-SCNC: 23 MMOL/L (ref 22–29)
CREAT SERPL-MCNC: 0.9 MG/DL (ref 0.5–1.2)
D DIMER PPP FEU-MCNC: <0.27 UG/ML FEU (ref 0–0.48)
EOSINOPHIL # BLD: 0.1 K/UL (ref 0–0.6)
EOSINOPHIL NFR BLD: 1.1 % (ref 0–5)
ERYTHROCYTE [DISTWIDTH] IN BLOOD BY AUTOMATED COUNT: 12.6 % (ref 11.5–14.5)
GLUCOSE SERPL-MCNC: 96 MG/DL (ref 74–109)
HCT VFR BLD AUTO: 43.5 % (ref 42–52)
HGB BLD-MCNC: 14.5 G/DL (ref 14–18)
IMM GRANULOCYTES # BLD: 0 K/UL
LIPASE SERPL-CCNC: 47 U/L (ref 13–60)
LYMPHOCYTES # BLD: 2.5 K/UL (ref 1.1–4.5)
LYMPHOCYTES NFR BLD: 35.3 % (ref 20–40)
MAGNESIUM SERPL-MCNC: 2.1 MG/DL (ref 1.6–2.6)
MCH RBC QN AUTO: 31.1 PG (ref 27–31)
MCHC RBC AUTO-ENTMCNC: 33.3 G/DL (ref 33–37)
MCV RBC AUTO: 93.3 FL (ref 80–94)
MONOCYTES # BLD: 0.6 K/UL (ref 0–0.9)
MONOCYTES NFR BLD: 9 % (ref 0–10)
NEUTROPHILS # BLD: 3.7 K/UL (ref 1.5–7.5)
NEUTS SEG NFR BLD: 53.6 % (ref 50–65)
PLATELET # BLD AUTO: 377 K/UL (ref 130–400)
PMV BLD AUTO: 8.9 FL (ref 9.4–12.4)
POTASSIUM SERPL-SCNC: 4.5 MMOL/L (ref 3.5–5)
PROT SERPL-MCNC: 7.3 G/DL (ref 6.6–8.7)
RBC # BLD AUTO: 4.66 M/UL (ref 4.7–6.1)
SODIUM SERPL-SCNC: 141 MMOL/L (ref 136–145)
TROPONIN, HIGH SENSITIVITY: <6 NG/L (ref 0–22)
WBC # BLD AUTO: 7 K/UL (ref 4.8–10.8)

## 2024-03-08 PROCEDURE — 93005 ELECTROCARDIOGRAM TRACING: CPT | Performed by: EMERGENCY MEDICINE

## 2024-03-08 PROCEDURE — 80053 COMPREHEN METABOLIC PANEL: CPT

## 2024-03-08 PROCEDURE — 99285 EMERGENCY DEPT VISIT HI MDM: CPT

## 2024-03-08 PROCEDURE — 83690 ASSAY OF LIPASE: CPT

## 2024-03-08 PROCEDURE — 71275 CT ANGIOGRAPHY CHEST: CPT

## 2024-03-08 PROCEDURE — 36415 COLL VENOUS BLD VENIPUNCTURE: CPT

## 2024-03-08 PROCEDURE — 6360000004 HC RX CONTRAST MEDICATION: Performed by: EMERGENCY MEDICINE

## 2024-03-08 PROCEDURE — 85025 COMPLETE CBC W/AUTO DIFF WBC: CPT

## 2024-03-08 PROCEDURE — 6370000000 HC RX 637 (ALT 250 FOR IP): Performed by: EMERGENCY MEDICINE

## 2024-03-08 PROCEDURE — 71045 X-RAY EXAM CHEST 1 VIEW: CPT

## 2024-03-08 PROCEDURE — 84484 ASSAY OF TROPONIN QUANT: CPT

## 2024-03-08 PROCEDURE — 2580000003 HC RX 258: Performed by: EMERGENCY MEDICINE

## 2024-03-08 PROCEDURE — 74177 CT ABD & PELVIS W/CONTRAST: CPT

## 2024-03-08 PROCEDURE — C9113 INJ PANTOPRAZOLE SODIUM, VIA: HCPCS | Performed by: EMERGENCY MEDICINE

## 2024-03-08 PROCEDURE — 96374 THER/PROPH/DIAG INJ IV PUSH: CPT

## 2024-03-08 PROCEDURE — 83735 ASSAY OF MAGNESIUM: CPT

## 2024-03-08 PROCEDURE — 6360000002 HC RX W HCPCS: Performed by: EMERGENCY MEDICINE

## 2024-03-08 PROCEDURE — 85379 FIBRIN DEGRADATION QUANT: CPT

## 2024-03-08 RX ORDER — ASPIRIN 81 MG/1
324 TABLET, CHEWABLE ORAL ONCE
Status: COMPLETED | OUTPATIENT
Start: 2024-03-08 | End: 2024-03-08

## 2024-03-08 RX ORDER — LISINOPRIL 10 MG/1
1 TABLET ORAL
COMMUNITY

## 2024-03-08 RX ORDER — TIZANIDINE 4 MG/1
TABLET ORAL
COMMUNITY
End: 2024-05-30

## 2024-03-08 RX ORDER — ROSUVASTATIN CALCIUM 10 MG/1
TABLET, COATED ORAL
COMMUNITY
Start: 2024-03-06

## 2024-03-08 RX ORDER — ASPIRIN 81 MG/1
81 TABLET ORAL DAILY
Qty: 90 TABLET | Refills: 0 | Status: SHIPPED | OUTPATIENT
Start: 2024-03-08

## 2024-03-08 RX ORDER — ONDANSETRON 4 MG/1
4 TABLET, ORALLY DISINTEGRATING ORAL 3 TIMES DAILY PRN
Qty: 21 TABLET | Refills: 0 | Status: SHIPPED | OUTPATIENT
Start: 2024-03-08

## 2024-03-08 RX ORDER — PANTOPRAZOLE SODIUM 40 MG/1
40 TABLET, DELAYED RELEASE ORAL
Qty: 60 TABLET | Refills: 0 | Status: SHIPPED | OUTPATIENT
Start: 2024-03-08

## 2024-03-08 RX ORDER — GABAPENTIN 300 MG
300 CAPSULE ORAL
COMMUNITY
Start: 2023-03-14

## 2024-03-08 RX ORDER — ERGOCALCIFEROL 1.25 MG/1
CAPSULE ORAL
COMMUNITY

## 2024-03-08 RX ADMIN — ALUMINUM HYDROXIDE, MAGNESIUM HYDROXIDE, AND SIMETHICONE: 200; 200; 20 SUSPENSION ORAL at 18:03

## 2024-03-08 RX ADMIN — IOPAMIDOL 75 ML: 755 INJECTION, SOLUTION INTRAVENOUS at 18:13

## 2024-03-08 RX ADMIN — ASPIRIN 324 MG: 81 TABLET, CHEWABLE ORAL at 17:57

## 2024-03-08 RX ADMIN — SODIUM CHLORIDE, PRESERVATIVE FREE 40 MG: 5 INJECTION INTRAVENOUS at 17:59

## 2024-03-08 ASSESSMENT — PAIN - FUNCTIONAL ASSESSMENT: PAIN_FUNCTIONAL_ASSESSMENT: 0-10

## 2024-03-08 ASSESSMENT — PAIN DESCRIPTION - ORIENTATION: ORIENTATION: MID

## 2024-03-08 ASSESSMENT — PAIN DESCRIPTION - DESCRIPTORS: DESCRIPTORS: PRESSURE

## 2024-03-08 ASSESSMENT — ENCOUNTER SYMPTOMS
VOMITING: 1
DIARRHEA: 1
NAUSEA: 1
EYES NEGATIVE: 1
SHORTNESS OF BREATH: 1

## 2024-03-08 ASSESSMENT — PAIN DESCRIPTION - LOCATION: LOCATION: CHEST;ABDOMEN

## 2024-03-08 NOTE — ED PROVIDER NOTES
[facility] EMERGENCY DEPT  EMERGENCY DEPARTMENT ENCOUNTER      Pt Name: Niraj Manriquez  MRN: [id number]  Birthdate 1975  Date of evaluation: 3/8/2024  Provider: Koko Bynum Jr, MD    CHIEF COMPLAINT       Chief Complaint   Patient presents with    Chest Pain     Hx hypertension. L arm pins and needles since 1300. Chest pain 10/10 prior to arrival.         HISTORY OF PRESENT ILLNESS   (Location/Symptom, Timing/Onset,Context/Setting, Quality, Duration, Modifying Factors, Severity)  Note limiting factors.   Niraj Manriquez is a 49 y.o. male who presents to the emergency department for evaluation after having onset of chest pain around 1230 or 1:00 this afternoon.  Says that symptoms initially started with some tingling down the left arm followed by tightness sensation in his chest.  Says the pain now seems to be more in his abdomen associated with nausea and vomiting.  Has had diarrhea that started since arrival here as well.  Denies any preceding GI symptoms.  Says it does have history of reflux and has felt like he was having heartburn intermittently over the last few weeks.  Denies any prior cardiac history.  Says that he has had high cholesterol in the past.  He was on a statin a couple years ago but was having episodes where he was passing out but it could be related to the medication so he stopped taking it.  Says that he had recent labs done that showed his cholesterol was still high so I called and Crestor for him but he has not started yet.  No prior cardiac history  Chest pain has resolved by time of evaluation here.    Also reports some intermittent shortness of breath over the last couple of weeks    HPI    NursingNotes were reviewed.    REVIEW OF SYSTEMS    (2-9 systems for level 4, 10 or more for level 5)     Review of Systems   Constitutional: Negative.    HENT: Negative.     Eyes: Negative.    Respiratory:  Positive for shortness of breath.    Cardiovascular:  Positive for chest pain.   Gastrointestinal:   Positive for diarrhea, nausea and vomiting.   Genitourinary: Negative.    Musculoskeletal: Negative.    Skin: Negative.    Neurological:  Positive for numbness.   Hematological: Negative.    Psychiatric/Behavioral: Negative.         A complete review of systems was performed and is negative except as noted above in the HPI.       PAST MEDICAL HISTORY   History reviewed. No pertinent past medical history.      SURGICAL HISTORY       Past Surgical History:   Procedure Laterality Date    BACK SURGERY           CURRENT MEDICATIONS       Previous Medications    LISINOPRIL (PRINIVIL;ZESTRIL) 10 MG TABLET    Take 1 tablet by mouth Every Day    NEURONTIN 300 MG CAPSULE    Take 1 capsule by mouth.    ROSUVASTATIN (CRESTOR) 10 MG TABLET    1 tablet Orally Once a day for 90    TIZANIDINE (ZANAFLEX) 4 MG TABLET    1 tablet as needed Orally Three times a day for 90 days    VITAMIN D (ERGOCALCIFEROL) 1.25 MG (47690 UT) CAPS CAPSULE    TAKE 1 CAPSULE BY MOUTH 1 TIME WEEKLY for 28       ALLERGIES     Patient has no known allergies.    FAMILY HISTORY     History reviewed. No pertinent family history.       SOCIAL HISTORY       Social History     Socioeconomic History    Marital status:      Spouse name: None    Number of children: None    Years of education: None    Highest education level: None   Tobacco Use    Smoking status: Every Day     Types: Cigarettes    Smokeless tobacco: Current     Types: Chew   Substance and Sexual Activity    Alcohol use: No    Drug use: No       SCREENINGS    Johnie Coma Scale  Eye Opening: Spontaneous  Best Verbal Response: Oriented  Best Motor Response: Obeys commands  Johnie Coma Scale Score: 15        PHYSICAL EXAM    (up to 7 for level 4, 8 or more for level 5)     ED Triage Vitals [03/08/24 1647]   BP Temp Temp Source Pulse Respirations SpO2 Height Weight - Scale   134/89 97.8 °F (36.6 °C) Oral 80 18 96 % 1.803 m (5' 11\") 90.7 kg (200 lb)       Physical Exam  Vitals and nursing note

## 2024-03-11 ENCOUNTER — TRANSCRIBE ORDERS (OUTPATIENT)
Dept: ADMINISTRATIVE | Age: 49
End: 2024-03-11

## 2024-03-11 DIAGNOSIS — R07.9 CHEST PAIN, UNSPECIFIED TYPE: Primary | ICD-10-CM

## 2024-03-11 LAB
EKG P AXIS: 57 DEGREES
EKG P-R INTERVAL: 156 MS
EKG Q-T INTERVAL: 410 MS
EKG QRS DURATION: 106 MS
EKG QTC CALCULATION (BAZETT): 425 MS
EKG T AXIS: 23 DEGREES

## 2024-03-11 PROCEDURE — 93010 ELECTROCARDIOGRAM REPORT: CPT | Performed by: INTERNAL MEDICINE

## 2024-03-12 RX ORDER — PANTOPRAZOLE SODIUM 40 MG/1
40 TABLET, DELAYED RELEASE ORAL
Qty: 180 TABLET | OUTPATIENT
Start: 2024-03-12

## 2024-03-29 ENCOUNTER — HOSPITAL ENCOUNTER (OUTPATIENT)
Dept: NON INVASIVE DIAGNOSTICS | Age: 49
Discharge: HOME | End: 2024-03-29
Payer: MEDICARE

## 2024-03-29 DIAGNOSIS — R07.9 CHEST PAIN, UNSPECIFIED TYPE: ICD-10-CM

## 2024-03-29 PROCEDURE — C8928 TTE W OR W/O FOL W/CON,STRES: HCPCS

## 2024-03-29 PROCEDURE — 6360000004 HC RX CONTRAST MEDICATION: Performed by: PHYSICIAN ASSISTANT

## 2024-03-29 RX ADMIN — PERFLUTREN 1.5 ML: 6.52 INJECTION, SUSPENSION INTRAVENOUS at 08:39
